# Patient Record
Sex: FEMALE | Race: WHITE | NOT HISPANIC OR LATINO | Employment: OTHER | URBAN - METROPOLITAN AREA
[De-identification: names, ages, dates, MRNs, and addresses within clinical notes are randomized per-mention and may not be internally consistent; named-entity substitution may affect disease eponyms.]

---

## 2022-07-10 ENCOUNTER — OFFICE VISIT (OUTPATIENT)
Dept: URGENT CARE | Facility: URGENT CARE | Age: 72
End: 2022-07-10
Payer: MEDICARE

## 2022-07-10 VITALS
WEIGHT: 203 LBS | DIASTOLIC BLOOD PRESSURE: 76 MMHG | HEART RATE: 85 BPM | BODY MASS INDEX: 35.97 KG/M2 | TEMPERATURE: 97.2 F | OXYGEN SATURATION: 96 % | SYSTOLIC BLOOD PRESSURE: 118 MMHG | HEIGHT: 63 IN

## 2022-07-10 DIAGNOSIS — M54.50 LEFT LOW BACK PAIN, UNSPECIFIED CHRONICITY, UNSPECIFIED WHETHER SCIATICA PRESENT: Primary | ICD-10-CM

## 2022-07-10 LAB
ALBUMIN UR-MCNC: NEGATIVE MG/DL
APPEARANCE UR: CLEAR
BILIRUB UR QL STRIP: NEGATIVE
COLOR UR AUTO: YELLOW
GLUCOSE UR STRIP-MCNC: >=1000 MG/DL
HGB UR QL STRIP: NEGATIVE
KETONES UR STRIP-MCNC: NEGATIVE MG/DL
LEUKOCYTE ESTERASE UR QL STRIP: NEGATIVE
NITRATE UR QL: NEGATIVE
PH UR STRIP: 6 [PH] (ref 5–7)
SP GR UR STRIP: 1.01 (ref 1–1.03)
UROBILINOGEN UR STRIP-ACNC: 0.2 E.U./DL

## 2022-07-10 PROCEDURE — 99204 OFFICE O/P NEW MOD 45 MIN: CPT | Performed by: STUDENT IN AN ORGANIZED HEALTH CARE EDUCATION/TRAINING PROGRAM

## 2022-07-10 PROCEDURE — 81003 URINALYSIS AUTO W/O SCOPE: CPT

## 2022-07-10 RX ORDER — BUMETANIDE 2 MG/1
TABLET ORAL
COMMUNITY
Start: 2021-08-23

## 2022-07-10 RX ORDER — CETIRIZINE HYDROCHLORIDE 10 MG/1
10 TABLET ORAL DAILY
COMMUNITY
Start: 2022-06-10

## 2022-07-10 RX ORDER — DAPAGLIFLOZIN 10 MG/1
10 TABLET, FILM COATED ORAL DAILY
COMMUNITY
Start: 2022-06-19

## 2022-07-10 RX ORDER — GLIPIZIDE 5 MG/1
TABLET, FILM COATED, EXTENDED RELEASE ORAL
COMMUNITY
Start: 2022-06-09

## 2022-07-10 RX ORDER — FENOFIBRATE 40 MG/1
TABLET ORAL
COMMUNITY

## 2022-07-10 RX ORDER — DULOXETIN HYDROCHLORIDE 60 MG/1
CAPSULE, DELAYED RELEASE ORAL
COMMUNITY
Start: 2022-06-07

## 2022-07-10 RX ORDER — LOSARTAN POTASSIUM 100 MG/1
TABLET ORAL
COMMUNITY

## 2022-07-10 RX ORDER — VENLAFAXINE HYDROCHLORIDE 150 MG/1
150 CAPSULE, EXTENDED RELEASE ORAL
COMMUNITY

## 2022-07-10 RX ORDER — CLOBETASOL PROPIONATE 0.5 MG/G
1 OINTMENT TOPICAL
COMMUNITY

## 2022-07-10 RX ORDER — ATENOLOL 25 MG/1
25 TABLET ORAL DAILY
COMMUNITY
Start: 2022-06-07

## 2022-07-10 RX ORDER — PIOGLITAZONEHYDROCHLORIDE 30 MG/1
TABLET ORAL
COMMUNITY
Start: 2021-10-20

## 2022-07-10 RX ORDER — CYCLOBENZAPRINE HCL 5 MG
TABLET ORAL
COMMUNITY
Start: 2022-01-28

## 2022-07-10 RX ORDER — TRAZODONE HYDROCHLORIDE 100 MG/1
TABLET ORAL
COMMUNITY
Start: 2022-04-25

## 2022-07-10 RX ORDER — LIDOCAINE 4 G/G
1 PATCH TOPICAL EVERY 24 HOURS
Qty: 30 PATCH | Refills: 0 | Status: SHIPPED | OUTPATIENT
Start: 2022-07-10

## 2022-07-10 NOTE — PROGRESS NOTES
"[facility]ENT [facility] - [facility]    Assessment & Plan    Sandra Mondragon is a 71 year old with a significant past medical history of chronic back pain due to degenerative disc disease s/p spinal fusion and CKD stage 3 presenting with the following issues:    Problem List Items Addressed This Visit    None     Visit Diagnoses     Left low back pain, unspecified chronicity, unspecified whether sciatica present    -  Primary    Relevant Medications        Lidocaine (LIDOCARE) 4 % Patch        Reconciled meds as below - given significant medication burden, recommended topical analgesic therapy for now and follow up with primary provider in [place]. May consider addition of muscle relaxer if unhelpful.    No follow-ups on file.   Return precautions discussed.    Mirna Montoya MD      Subjective     L-sided back pain around the waist line and down the leg. Is worried about a herniated disc, which she has had in the past. Has been having constant pain, radiating pain; seems to improve with leaning to the R. No numbness or tingling anywhere. Had back surgery this past May in [facility].    Has some baseline paresthesias in the foot but also has some numbness that has persisted since the end of May.    Usually goes to Dr. Watkins in [place], not going back to doctor until August, just visiting daughter here for the summer.    Does not have any urinary symptoms.      Meds  Omeprazole  Losartan  Atenolol  Glipizide  Fenofibrate  Duloxetine  farxiga  Bumetanide  Cetirizine  Trazodone  Eye drops    Objective     /76   Pulse 85   Temp 97.2  F (36.2  C) (Temporal)   Ht 1.6 m (5' 3\")   Wt 92.1 kg (203 lb)   SpO2 96%   BMI 35.96 kg/m     Physical Exam  Constitutional:       General: She is not in acute distress.     Appearance: Normal appearance. She is not ill-appearing, toxic-appearing or diaphoretic.   Pulmonary:      Effort: Pulmonary effort is normal.   Musculoskeletal:         General: Tenderness (in the sacroiliac area " bilaterally; no midline tenderness) present. No signs of injury.   Skin:     General: Skin is warm and dry.   Neurological:      Mental Status: She is alert.      Sensory: Sensory deficit (over the dorsal aspect of the foot on the  medial side - baseline per patient) present.      Motor: No weakness (5/5 hip flexion, knee flexion and extension, toe dorsi/plantarflexion).          Labs and imaging notable for: n/a

## 2022-07-10 NOTE — PATIENT INSTRUCTIONS
You can take up to 1000mg of tylenol 3x daily (no more than 3500mg in a 24h period).    Use the lidocaine patch on the painful area. You should replace it daily.    If you develop any numbness between the legs or weakness or incontinence, then you should go see a doctor.

## 2022-07-13 ENCOUNTER — HOSPITAL ENCOUNTER (EMERGENCY)
Facility: CLINIC | Age: 72
Discharge: HOME | End: 2022-07-13
Attending: EMERGENCY MEDICINE | Admitting: EMERGENCY MEDICINE
Payer: MEDICARE

## 2022-07-13 ENCOUNTER — APPOINTMENT (OUTPATIENT)
Dept: GENERAL RADIOLOGY | Facility: CLINIC | Age: 72
End: 2022-07-13
Attending: EMERGENCY MEDICINE
Payer: MEDICARE

## 2022-07-13 VITALS
RESPIRATION RATE: 18 BRPM | OXYGEN SATURATION: 97 % | TEMPERATURE: 98.1 F | DIASTOLIC BLOOD PRESSURE: 62 MMHG | SYSTOLIC BLOOD PRESSURE: 139 MMHG | HEART RATE: 68 BPM

## 2022-07-13 DIAGNOSIS — M54.16 LUMBAR RADICULOPATHY: ICD-10-CM

## 2022-07-13 DIAGNOSIS — M54.42 ACUTE LEFT-SIDED LOW BACK PAIN WITH LEFT-SIDED SCIATICA: ICD-10-CM

## 2022-07-13 LAB
ANION GAP SERPL CALCULATED.3IONS-SCNC: 6 MMOL/L (ref 3–14)
BUN SERPL-MCNC: 23 MG/DL (ref 7–30)
CALCIUM SERPL-MCNC: 9.5 MG/DL (ref 8.5–10.1)
CHLORIDE BLD-SCNC: 101 MMOL/L (ref 94–109)
CO2 SERPL-SCNC: 27 MMOL/L (ref 20–32)
CREAT SERPL-MCNC: 1.28 MG/DL (ref 0.52–1.04)
GFR SERPL CREATININE-BSD FRML MDRD: 45 ML/MIN/1.73M2
GLUCOSE BLD-MCNC: 356 MG/DL (ref 70–99)
POTASSIUM BLD-SCNC: 3.9 MMOL/L (ref 3.4–5.3)
SODIUM SERPL-SCNC: 134 MMOL/L (ref 133–144)

## 2022-07-13 PROCEDURE — 250N000012 HC RX MED GY IP 250 OP 636 PS 637: Performed by: EMERGENCY MEDICINE

## 2022-07-13 PROCEDURE — 80048 BASIC METABOLIC PNL TOTAL CA: CPT | Performed by: EMERGENCY MEDICINE

## 2022-07-13 PROCEDURE — 250N000013 HC RX MED GY IP 250 OP 250 PS 637: Performed by: EMERGENCY MEDICINE

## 2022-07-13 PROCEDURE — 250N000011 HC RX IP 250 OP 636: Performed by: EMERGENCY MEDICINE

## 2022-07-13 PROCEDURE — 72100 X-RAY EXAM L-S SPINE 2/3 VWS: CPT

## 2022-07-13 PROCEDURE — 36415 COLL VENOUS BLD VENIPUNCTURE: CPT | Performed by: EMERGENCY MEDICINE

## 2022-07-13 PROCEDURE — 99284 EMERGENCY DEPT VISIT MOD MDM: CPT

## 2022-07-13 RX ORDER — ACETAMINOPHEN 500 MG
1000 TABLET ORAL ONCE
Status: COMPLETED | OUTPATIENT
Start: 2022-07-13 | End: 2022-07-13

## 2022-07-13 RX ORDER — OXYCODONE HYDROCHLORIDE 5 MG/1
5 TABLET ORAL EVERY 6 HOURS PRN
Qty: 12 TABLET | Refills: 0 | Status: SHIPPED | OUTPATIENT
Start: 2022-07-13 | End: 2022-07-16

## 2022-07-13 RX ORDER — ONDANSETRON 4 MG/1
4 TABLET, ORALLY DISINTEGRATING ORAL ONCE
Status: COMPLETED | OUTPATIENT
Start: 2022-07-13 | End: 2022-07-13

## 2022-07-13 RX ORDER — METHYLPREDNISOLONE 4 MG
TABLET, DOSE PACK ORAL
Qty: 21 TABLET | Refills: 0 | Status: SHIPPED | OUTPATIENT
Start: 2022-07-13

## 2022-07-13 RX ORDER — PREDNISONE 20 MG/1
60 TABLET ORAL ONCE
Status: COMPLETED | OUTPATIENT
Start: 2022-07-13 | End: 2022-07-13

## 2022-07-13 RX ORDER — OXYCODONE HYDROCHLORIDE 5 MG/1
10 TABLET ORAL ONCE
Status: DISCONTINUED | OUTPATIENT
Start: 2022-07-13 | End: 2022-07-13

## 2022-07-13 RX ORDER — SENNA AND DOCUSATE SODIUM 50; 8.6 MG/1; MG/1
1-2 TABLET, FILM COATED ORAL 2 TIMES DAILY PRN
Qty: 30 TABLET | Refills: 0 | Status: SHIPPED | OUTPATIENT
Start: 2022-07-13 | End: 2022-08-12

## 2022-07-13 RX ORDER — OXYCODONE HYDROCHLORIDE 5 MG/1
5 TABLET ORAL ONCE
Status: COMPLETED | OUTPATIENT
Start: 2022-07-13 | End: 2022-07-13

## 2022-07-13 RX ORDER — METHOCARBAMOL 750 MG/1
750-1500 TABLET, FILM COATED ORAL 3 TIMES DAILY PRN
Qty: 30 TABLET | Refills: 0 | Status: SHIPPED | OUTPATIENT
Start: 2022-07-13 | End: 2022-07-18

## 2022-07-13 RX ADMIN — PREDNISONE 60 MG: 20 TABLET ORAL at 20:59

## 2022-07-13 RX ADMIN — ACETAMINOPHEN 1000 MG: 500 TABLET, FILM COATED ORAL at 20:59

## 2022-07-13 RX ADMIN — ONDANSETRON 4 MG: 4 TABLET, ORALLY DISINTEGRATING ORAL at 21:01

## 2022-07-13 RX ADMIN — OXYCODONE HYDROCHLORIDE 5 MG: 5 TABLET ORAL at 20:59

## 2022-07-13 ASSESSMENT — ENCOUNTER SYMPTOMS
NAUSEA: 0
CHILLS: 0
BACK PAIN: 1
FREQUENCY: 0
DIARRHEA: 0
FEVER: 0
CONSTIPATION: 0
VOMITING: 0
ABDOMINAL PAIN: 0
DYSURIA: 0
MYALGIAS: 1
SHORTNESS OF BREATH: 0

## 2022-07-13 NOTE — ED TRIAGE NOTES
Pt had back surgery last year. Pt was seen at  over the weekend and prescribed her lido patches. Pt is still having back pain.

## 2022-07-14 NOTE — ED PROVIDER NOTES
History   Chief Complaint:  Back Pain    The history is provided by the patient.      Sandra Mondragon is a 71 year old female with history of type II diabetes, stage 3 CKD, hypertension, CARRIE, GERD, and hyperlipidemia who presents with back pain. Patient reports onset of back pain 4-5 days ago. States the pain shoots down her left leg and feels similar to the pain she had with a herniated disc as well as before her spinal surgery one year ago.  Notes that she has normal swelling to her left hip and thigh area. Reports she has been taking Tylenol without alleviation of her pain. Patient had L4/L5 and S1/S1 fusion in two separate surgeries over the course of three days in May 2021. States that her nerve pain has been mostly controled since the surgery, but she has had one episode of pain since then.  Before her surgery, epidurals helped alleviate her pain most of the time. Notes that she does not react well to codeine. Denies history of blood sugar issues and oral steroid use. Denies changes in urination and bowel movements, abdominal pain, fever, chills, nausea, vomiting, chest pain, and shortness of breath.    Review of Systems   Constitutional: Negative for chills and fever.   Respiratory: Negative for shortness of breath.    Cardiovascular: Negative for chest pain.   Gastrointestinal: Negative for abdominal pain, constipation, diarrhea, nausea and vomiting.   Genitourinary: Negative for dysuria and frequency.   Musculoskeletal: Positive for back pain and myalgias.   All other systems reviewed and are negative.    Allergies:  Codeine  Hmg-Coa-R Inhibitors  Nickel  Sulfa Drugs  Penicillins    Medications:  Lidocaine patch  Atenolol  Bumetanide  Cetirizine  Duloxetine  Farxiga  Fenofibrate  Glipizide   Losartan  Omeprazole  Pioglitazone  Trazodone  Venlafaxine     Past Medical History:     Type II diabetes, with complications  Hypertension   Stage 3 CKD  Sleep apnea  Depression  Arthritis  Lichen  sclerosus  Obesity  Sciatica, bilateral  Radiculopathy  Stenosis spiral  Osteoarthritis  CARRIE  GERD  Hyperlipidemia  Herniated disc, lumbar  Spondylolisthesis Lumbar Region  Cataract  Kidney stone  Colon polyp  Migraine  Pneumonia  Hormone Replacement Therapy  Oral abscess    Past Surgical History:    Hysterectomy  Arthroplasty knee, bilateral  Arthroplasty shoulder, right  Reduction mammoplasty, bilateral  Back surgery  Therapeutic     Family History:    Father- anesthesia problems, arthritis, CAD, hyperlipidemia, cancer  Mother- lung cancer, arthritis    Social History:  Presents with , Dirk  Presents via private vehicle  Visiting her kids in [facility] back to [place] mid August    Physical Exam     Patient Vitals for the past 24 hrs:   BP Temp Temp src Pulse Resp SpO2   22 1815 139/62 98.1  F (36.7  C) Temporal 68 18 97 %     Physical Exam  Constitutional: Well developed, nontox appearance  Head: Atraumatic.   Neck:  no stridor  Eyes: no scleral icterus  Cardiovascular: RRR, 2+ bilat DP pulses  Pulmonary/Chest: nml resp effort  Abdominal: ND, soft, NT, no rebound or guarding   Ext: Warm, well perfused, no edema  Neurological: A&O, symmetric facies, moves ext x4, 5/5 strength throughout bilateral lower extremities, sensation grossly intact  Skin: Skin is warm and dry.   Psychiatric: Behavior is normal. Thought content normal.   Nursing note and vitals reviewed.    Emergency Department Course   Imaging:  Lumbar spine XR, 2-3 views   Final Result   IMPRESSION: Postoperative changes in the lower lumbar spine with posterior kris and screw fixation and anterior fixation from L4 to S1. Metal hardware appears intact on these views. No loss of vertebral body height. Mild to moderate degenerative endplate    changes and loss of disc height at the nonfused levels.      MRI Lumbar spine w & w/o contrast    (Results Pending)     Report per radiology    Laboratory:  Labs Ordered and Resulted from  Time of ED Arrival to Time of ED Departure   BASIC METABOLIC PANEL - Abnormal       Result Value    Sodium 134      Potassium 3.9      Chloride 101      Carbon Dioxide (CO2) 27      Anion Gap 6      Urea Nitrogen 23      Creatinine 1.28 (*)     Calcium 9.5      Glucose 356 (*)     GFR Estimate 45 (*)      Emergency Department Course:     Reviewed:  I reviewed nursing notes, vitals, past medical history and Care Everywhere    Assessments:   I obtained history and examined the patient as noted above.    I completed patient physical exam.    Interventions:   Prednisone, 50 mg, PO   Oxycodone, 4 mg, PO   Acetaminophen, 1000 mg, PO   Ondansetron, 4 mg, PO    Disposition:  The patient was discharged to home.     Impression & Plan   Medical Decision Makin year old female presenting w/ left lumbar back pain typical to patient's symptoms with a herniated disc and sciatica     The patient presented with back pain and radicular symptoms.  Screening x-rays performed given age and previous operative repair although the patient has no myelopathic signs or symptoms indicating emergent Advanced imaging with CT/MRI.     Doubt vascular catastrophe, significant infection, occult fracture, spinal cord compromise, cauda equina syndrome given history and physical exam.  An outpatient MRI is ordered for further evaluation with prescriptions written as noted below for outpatient management.  Interventions as noted above prior to discharge. At this time I feel the pt is safe for discharge.  Recommendations given regarding follow up with PCP for MRI results and return to the emergency department as needed for new or worsening symptoms.    Patient counseled on all results, disposition and diagnosis.  They are understanding and agreeable to plan. Patient discharged in stable condition.      Diagnosis:    ICD-10-CM    1. Lumbar radiculopathy  M54.16 MRI Lumbar spine w & w/o contrast   2. Acute left-sided low back  pain with left-sided sciatica  M54.42 MRI Lumbar spine w & w/o contrast       Discharge Medications:  New Prescriptions    METHOCARBAMOL (ROBAXIN) 750 MG TABLET    Take 1-2 tablets (750-1,500 mg) by mouth 3 times daily as needed for muscle spasms    METHYLPREDNISOLONE (MEDROL DOSEPAK) 4 MG TABLET THERAPY PACK    Follow Package Directions    OXYCODONE (ROXICODONE) 5 MG TABLET    Take 1 tablet (5 mg) by mouth every 6 hours as needed for breakthrough pain, pain or severe pain    SENNA-DOCUSATE SODIUM (SENNA S) 8.6-50 MG TABLET    Take 1-2 tablets by mouth 2 times daily as needed (if taking oxycodone)     Scribe Disclosure:  I, Yady Barone, am serving as a scribe at 8:12 PM on 7/13/2022 to document services personally performed by Randall Brownlee MD based on my observations and the provider's statements to me.      Randall Brownlee MD  07/13/22 [id number]

## 2022-07-24 ENCOUNTER — HOSPITAL ENCOUNTER (OUTPATIENT)
Dept: MRI IMAGING | Facility: CLINIC | Age: 72
Discharge: HOME | End: 2022-07-24
Attending: EMERGENCY MEDICINE | Admitting: EMERGENCY MEDICINE
Payer: MEDICARE

## 2022-07-24 DIAGNOSIS — M54.16 LUMBAR RADICULOPATHY: ICD-10-CM

## 2022-07-24 DIAGNOSIS — M54.42 ACUTE LEFT-SIDED LOW BACK PAIN WITH LEFT-SIDED SCIATICA: ICD-10-CM

## 2022-07-24 PROCEDURE — 72158 MRI LUMBAR SPINE W/O & W/DYE: CPT | Mod: MF

## 2022-07-24 PROCEDURE — 255N000002 HC RX 255 OP 636: Performed by: EMERGENCY MEDICINE

## 2022-07-24 PROCEDURE — A9585 GADOBUTROL INJECTION: HCPCS | Performed by: EMERGENCY MEDICINE

## 2022-07-24 RX ORDER — GADOBUTROL 604.72 MG/ML
9 INJECTION INTRAVENOUS ONCE
Status: COMPLETED | OUTPATIENT
Start: 2022-07-24 | End: 2022-07-24

## 2022-07-24 RX ADMIN — GADOBUTROL 9 ML: 604.72 INJECTION INTRAVENOUS at 18:01
